# Patient Record
Sex: MALE | Race: WHITE | NOT HISPANIC OR LATINO | URBAN - NONMETROPOLITAN AREA
[De-identification: names, ages, dates, MRNs, and addresses within clinical notes are randomized per-mention and may not be internally consistent; named-entity substitution may affect disease eponyms.]

---

## 2017-02-07 ENCOUNTER — OFFICE VISIT (OUTPATIENT)
Dept: UROLOGY | Facility: CLINIC | Age: 47
End: 2017-02-07

## 2017-02-07 VITALS — DIASTOLIC BLOOD PRESSURE: 67 MMHG | SYSTOLIC BLOOD PRESSURE: 122 MMHG | HEART RATE: 80 BPM

## 2017-02-07 DIAGNOSIS — N52.9 ED (ERECTILE DYSFUNCTION) OF ORGANIC ORIGIN: Primary | ICD-10-CM

## 2017-02-07 DIAGNOSIS — N40.0 BENIGN NON-NODULAR PROSTATIC HYPERPLASIA WITHOUT LOWER URINARY TRACT SYMPTOMS: ICD-10-CM

## 2017-02-07 PROCEDURE — 99213 OFFICE O/P EST LOW 20 MIN: CPT | Performed by: NURSE PRACTITIONER

## 2017-02-07 RX ORDER — SILDENAFIL 100 MG/1
100 TABLET, FILM COATED ORAL AS NEEDED
Qty: 2 TABLET | Refills: 0 | COMMUNITY
Start: 2017-02-07 | End: 2019-10-15

## 2017-02-07 NOTE — PROGRESS NOTES
Chief Complaint:          Chief Complaint   Patient presents with   • Medicine Change       HPI:   46 y.o. male who is currently on Cialis fro BPH and erectile dysfunction presents wanting to try a sample of Viagra for erectile dysfunction.  States the Cialis works well most of the time but wanted to see if the Viagra would work better.    HPI        Past Medical History:        Past Medical History   Diagnosis Date   • Erectile dysfunction          Current Meds:     Current Outpatient Prescriptions   Medication Sig Dispense Refill   • tadalafil (CIALIS) 5 MG tablet Take  by mouth.       No current facility-administered medications for this visit.         Allergies:      No Known Allergies     Past Surgical History:     Past Surgical History   Procedure Laterality Date   • Vasectomy           Social History:     Social History     Social History   • Marital status: Single     Spouse name: N/A   • Number of children: N/A   • Years of education: N/A     Occupational History   • Not on file.     Social History Main Topics   • Smoking status: Current Every Day Smoker   • Smokeless tobacco: Not on file   • Alcohol use Yes   • Drug use: No   • Sexual activity: Not on file     Other Topics Concern   • Not on file     Social History Narrative       Family History:     Family History   Problem Relation Age of Onset   • No Known Problems Father    • No Known Problems Mother        Review of Systems:     Review of Systems   Constitutional: Negative for chills, fatigue and fever.   Respiratory: Positive for cough. Negative for shortness of breath and wheezing.    Cardiovascular: Negative for leg swelling.   Gastrointestinal: Negative for abdominal pain, nausea and vomiting.   Musculoskeletal: Negative for back pain and joint swelling.   Neurological: Negative for dizziness and headaches.   Psychiatric/Behavioral: Negative for confusion.       Physical Exam:     Physical Exam   Constitutional: He is oriented to person, place,  and time. He appears well-developed and well-nourished. No distress.   Neurological: He is alert and oriented to person, place, and time.   Skin: Skin is warm and dry. No rash noted. He is not diaphoretic. No erythema. No pallor.   Psychiatric: He has a normal mood and affect. His behavior is normal. Judgment and thought content normal.   Nursing note and vitals reviewed.      Procedure:     No notes on file      Assessment:     Encounter Diagnoses   Name Primary?   • ED (erectile dysfunction) of organic origin Yes   • Benign non-nodular prostatic hyperplasia without lower urinary tract symptoms      No orders of the defined types were placed in this encounter.      Plan:   Discussed Cialis use and Viagra use with the patient.  A sample of Viagra 100 mg given with directions of use.  Instructed him not to take the Cialis daily and the Viagra at the same time - verbalized understanding.    Counseling was given to patient for the following topics patient and family education, impressions and risks and benefits of treatment options. and the interim medical history and current results were reviewed.  A treatment plan with follow-up was made. Total time of the encounter was 15 minutes and 15 minutes were spent discussing ED (erectile dysfunction) of organic origin [N52.9] face-to-face.       This document has been electronically signed by SANTOS Christopher February 7, 2017 3:10 PM

## 2017-10-17 ENCOUNTER — OFFICE VISIT (OUTPATIENT)
Dept: UROLOGY | Facility: CLINIC | Age: 47
End: 2017-10-17

## 2017-10-17 VITALS — BODY MASS INDEX: 24.44 KG/M2 | HEIGHT: 69 IN | WEIGHT: 165 LBS

## 2017-10-17 DIAGNOSIS — N40.1 BENIGN PROSTATIC HYPERPLASIA WITH WEAK URINARY STREAM: Primary | ICD-10-CM

## 2017-10-17 DIAGNOSIS — R39.12 BENIGN PROSTATIC HYPERPLASIA WITH WEAK URINARY STREAM: Primary | ICD-10-CM

## 2017-10-17 PROCEDURE — 99213 OFFICE O/P EST LOW 20 MIN: CPT | Performed by: UROLOGY

## 2017-10-17 RX ORDER — TADALAFIL 5 MG/1
5 TABLET ORAL DAILY
Qty: 30 TABLET | Refills: 11 | Status: SHIPPED | OUTPATIENT
Start: 2017-10-17 | End: 2018-10-09 | Stop reason: SDUPTHER

## 2017-10-17 NOTE — PROGRESS NOTES
Chief Complaint:          Chief Complaint   Patient presents with   • Benign Prostatic Hypertrophy     Yearly follow up    • Erectile Dysfunction       HPI:   47 y.o. male.    HPI  Pt has been on daily cialis for BPH for 2 years with good results.    Past Medical History:        Past Medical History:   Diagnosis Date   • Erectile dysfunction          Current Meds:     Current Outpatient Prescriptions   Medication Sig Dispense Refill   • sildenafil (VIAGRA) 100 MG tablet Take 1 tablet by mouth As Needed for erectile dysfunction. 2 tablet 0   • tadalafil (CIALIS) 5 MG tablet Take 1 tablet by mouth Daily. 30 tablet 11     No current facility-administered medications for this visit.         Allergies:      No Known Allergies     Past Surgical History:     Past Surgical History:   Procedure Laterality Date   • VASECTOMY           Social History:     Social History     Social History   • Marital status: Single     Spouse name: N/A   • Number of children: N/A   • Years of education: N/A     Occupational History   • Not on file.     Social History Main Topics   • Smoking status: Current Every Day Smoker   • Smokeless tobacco: Not on file   • Alcohol use Yes   • Drug use: No   • Sexual activity: Not on file     Other Topics Concern   • Not on file     Social History Narrative       Family History:     Family History   Problem Relation Age of Onset   • No Known Problems Father    • No Known Problems Mother        Review of Systems:     Review of Systems    Physical Exam:     Physical Exam   Constitutional: He is oriented to person, place, and time.   HENT:   Head: Normocephalic and atraumatic.   Right Ear: External ear normal.   Left Ear: External ear normal.   Nose: Nose normal.   Mouth/Throat: Oropharynx is clear and moist.   Eyes: Conjunctivae and EOM are normal. Pupils are equal, round, and reactive to light.   Neck: Normal range of motion. Neck supple. No thyromegaly present.   Cardiovascular: Normal rate, regular  rhythm, normal heart sounds and intact distal pulses.    No murmur heard.  Pulmonary/Chest: Effort normal and breath sounds normal. No respiratory distress. He has no wheezes. He has no rales. He exhibits no tenderness.   Abdominal: Soft. Bowel sounds are normal. He exhibits no distension and no mass. There is no tenderness. No hernia.   Genitourinary: Rectum normal, prostate normal and penis normal.   Musculoskeletal: Normal range of motion. He exhibits no edema or tenderness.   Lymphadenopathy:     He has no cervical adenopathy.   Neurological: He is alert and oriented to person, place, and time. No cranial nerve deficit. He exhibits normal muscle tone. Coordination normal.   Skin: Skin is warm. No rash noted.   Psychiatric: He has a normal mood and affect. His behavior is normal. Judgment and thought content normal.   Nursing note and vitals reviewed.      Procedure:     No notes on file      Assessment:     Encounter Diagnosis   Name Primary?   • Benign prostatic hyperplasia with weak urinary stream Yes     Orders Placed This Encounter   Procedures   • PSA     Standing Status:   Future     Standing Expiration Date:   10/17/2020       Plan:   Will see pt back in on year for VU and psa.    Counseling was given to patient for the following topics impressions. and the interim medical history and current results were reviewed.  A treatment plan with follow-up was made. Total time of the encounter was 21 minutes and 21 minutes were spent discussing Benign prostatic hyperplasia with weak urinary stream [N40.1, R39.12] face-to-face.       This document has been electronically signed by Varun Sharma MD October 17, 2017 9:08 AM

## 2018-09-20 ENCOUNTER — TELEPHONE (OUTPATIENT)
Dept: UROLOGY | Facility: CLINIC | Age: 48
End: 2018-09-20

## 2018-09-20 NOTE — TELEPHONE ENCOUNTER
Patient called stating he needed a refill on his Cilias and I told the patient that he needed to keep his appt for 10/9/18. Dr cam will refill his Cilias on appointment date.

## 2018-10-09 ENCOUNTER — OFFICE VISIT (OUTPATIENT)
Dept: UROLOGY | Facility: CLINIC | Age: 48
End: 2018-10-09

## 2018-10-09 VITALS — WEIGHT: 157 LBS | HEIGHT: 69 IN | BODY MASS INDEX: 23.25 KG/M2

## 2018-10-09 DIAGNOSIS — N40.1 BENIGN PROSTATIC HYPERPLASIA WITH WEAK URINARY STREAM: ICD-10-CM

## 2018-10-09 DIAGNOSIS — R39.12 BENIGN PROSTATIC HYPERPLASIA WITH WEAK URINARY STREAM: ICD-10-CM

## 2018-10-09 LAB — PSA SERPL-MCNC: 1.27 NG/ML (ref 0–4)

## 2018-10-09 PROCEDURE — 36415 COLL VENOUS BLD VENIPUNCTURE: CPT | Performed by: UROLOGY

## 2018-10-09 PROCEDURE — 99213 OFFICE O/P EST LOW 20 MIN: CPT | Performed by: UROLOGY

## 2018-10-09 PROCEDURE — 84153 ASSAY OF PSA TOTAL: CPT | Performed by: UROLOGY

## 2018-10-09 RX ORDER — TADALAFIL 5 MG/1
5 TABLET ORAL DAILY
Qty: 30 TABLET | Refills: 11 | Status: SHIPPED | OUTPATIENT
Start: 2018-10-09 | End: 2019-09-23 | Stop reason: SDUPTHER

## 2018-10-09 NOTE — PROGRESS NOTES
Chief Complaint:          BPH and ED    HPI:   48 y.o. male.  Pt is on daily cialis 5 mg daily and it helps his BPH symptoms and he also benefits from it helping his erections.  Pt has yet to have a psa drawn this year.  HPI      Past Medical History:        Past Medical History:   Diagnosis Date   • Erectile dysfunction          Current Meds:     Current Outpatient Prescriptions   Medication Sig Dispense Refill   • sildenafil (VIAGRA) 100 MG tablet Take 1 tablet by mouth As Needed for erectile dysfunction. 2 tablet 0   • tadalafil (CIALIS) 5 MG tablet Take 1 tablet by mouth Daily. 30 tablet 11     No current facility-administered medications for this visit.         Allergies:      No Known Allergies     Past Surgical History:     Past Surgical History:   Procedure Laterality Date   • HERNIA REPAIR     • VASECTOMY           Social History:     Social History     Social History   • Marital status: Single     Spouse name: N/A   • Number of children: N/A   • Years of education: N/A     Occupational History   • Not on file.     Social History Main Topics   • Smoking status: Current Every Day Smoker   • Smokeless tobacco: Not on file   • Alcohol use Yes   • Drug use: No   • Sexual activity: Not on file     Other Topics Concern   • Not on file     Social History Narrative   • No narrative on file       Family History:     Family History   Problem Relation Age of Onset   • No Known Problems Father    • No Known Problems Mother        Review of Systems:     Review of Systems   Constitutional: Positive for fatigue.   HENT: Negative for sinus pain and sinus pressure.    Eyes: Negative for pain.   Respiratory: Negative for chest tightness.    Cardiovascular: Negative for chest pain.   Gastrointestinal: Negative for abdominal pain.   Endocrine: Negative for heat intolerance.   Genitourinary: Negative for frequency.   Musculoskeletal: Positive for back pain.   Skin: Negative for rash.   Allergic/Immunologic: Negative for food  "allergies.   Neurological: Negative for headaches.   Hematological: Does not bruise/bleed easily.             I have reviewed the follow portions of the patient's history and confirmed they are accurate today:  allergies, current medications, past family history, past medical history, past social history, past surgical history, problem list and ROS  Physical Exam:     Physical Exam   Constitutional: He is oriented to person, place, and time.   HENT:   Head: Normocephalic and atraumatic.   Right Ear: External ear normal.   Left Ear: External ear normal.   Nose: Nose normal.   Mouth/Throat: Oropharynx is clear and moist.   Eyes: Pupils are equal, round, and reactive to light. Conjunctivae and EOM are normal.   Neck: Normal range of motion. Neck supple. No thyromegaly present.   Cardiovascular: Normal rate, regular rhythm, normal heart sounds and intact distal pulses.    No murmur heard.  Pulmonary/Chest: Effort normal and breath sounds normal. No respiratory distress. He has no wheezes. He has no rales. He exhibits no tenderness.   Abdominal: Soft. Bowel sounds are normal. He exhibits no distension and no mass. There is no tenderness. No hernia.   Genitourinary: Rectum normal, prostate normal and penis normal.   Musculoskeletal: Normal range of motion. He exhibits no edema or tenderness.   Lymphadenopathy:     He has no cervical adenopathy.   Neurological: He is alert and oriented to person, place, and time. No cranial nerve deficit. He exhibits normal muscle tone. Coordination normal.   Skin: Skin is warm. No rash noted.   Psychiatric: He has a normal mood and affect. His behavior is normal. Judgment and thought content normal.   Nursing note and vitals reviewed.      Ht 175.3 cm (69\")   Wt 71.2 kg (157 lb)   BMI 23.18 kg/m²    Procedure:         Assessment:     Encounter Diagnosis   Name Primary?   • Benign prostatic hyperplasia with weak urinary stream      No orders of the defined types were placed in this " encounter.      Plan:   Will see pt next year and will check on the psa    Patient's Body mass index is 23.18 kg/m². BMI is within normal parameters. No follow-up required.      Counseling was given to patient for the following topics impressions as follows: BPH. The interim medical history and current results  This document has been electronically signed by Varun Sharma MD October 9, 2018 4:19 PM

## 2019-09-23 ENCOUNTER — TELEPHONE (OUTPATIENT)
Dept: UROLOGY | Facility: CLINIC | Age: 49
End: 2019-09-23

## 2019-09-23 DIAGNOSIS — R39.12 BENIGN PROSTATIC HYPERPLASIA WITH WEAK URINARY STREAM: ICD-10-CM

## 2019-09-23 DIAGNOSIS — N40.1 BENIGN PROSTATIC HYPERPLASIA WITH WEAK URINARY STREAM: ICD-10-CM

## 2019-09-23 RX ORDER — TADALAFIL 5 MG/1
5 TABLET ORAL DAILY
Qty: 30 TABLET | Refills: 11 | Status: SHIPPED | OUTPATIENT
Start: 2019-09-23 | End: 2019-10-15 | Stop reason: SDUPTHER

## 2019-10-15 ENCOUNTER — OFFICE VISIT (OUTPATIENT)
Dept: UROLOGY | Facility: CLINIC | Age: 49
End: 2019-10-15

## 2019-10-15 VITALS
BODY MASS INDEX: 22.66 KG/M2 | DIASTOLIC BLOOD PRESSURE: 72 MMHG | SYSTOLIC BLOOD PRESSURE: 118 MMHG | HEIGHT: 69 IN | WEIGHT: 153 LBS

## 2019-10-15 DIAGNOSIS — R35.0 FREQUENCY OF MICTURITION: Primary | ICD-10-CM

## 2019-10-15 DIAGNOSIS — N40.1 BENIGN PROSTATIC HYPERPLASIA WITH WEAK URINARY STREAM: ICD-10-CM

## 2019-10-15 DIAGNOSIS — R39.12 BENIGN PROSTATIC HYPERPLASIA WITH WEAK URINARY STREAM: ICD-10-CM

## 2019-10-15 LAB
BILIRUB BLD-MCNC: NEGATIVE MG/DL
CLARITY, POC: CLEAR
COLOR UR: YELLOW
GLUCOSE UR STRIP-MCNC: NEGATIVE MG/DL
KETONES UR QL: NEGATIVE
LEUKOCYTE EST, POC: NEGATIVE
NITRITE UR-MCNC: NEGATIVE MG/ML
PH UR: 6.5 [PH] (ref 5–8)
PROT UR STRIP-MCNC: NEGATIVE MG/DL
RBC # UR STRIP: NEGATIVE /UL
SP GR UR: 1.02 (ref 1–1.03)
UROBILINOGEN UR QL: NORMAL

## 2019-10-15 PROCEDURE — 81003 URINALYSIS AUTO W/O SCOPE: CPT | Performed by: UROLOGY

## 2019-10-15 PROCEDURE — 99213 OFFICE O/P EST LOW 20 MIN: CPT | Performed by: UROLOGY

## 2019-10-15 RX ORDER — IBUPROFEN 200 MG
200 TABLET ORAL AS NEEDED
COMMUNITY

## 2019-10-15 RX ORDER — TADALAFIL 5 MG/1
5 TABLET ORAL DAILY
Qty: 30 TABLET | Refills: 11 | Status: SHIPPED | OUTPATIENT
Start: 2019-10-15 | End: 2020-10-13 | Stop reason: SDUPTHER

## 2019-10-15 NOTE — PROGRESS NOTES
Chief Complaint:          BPH and ED.    HPI:   49 y.o. male.  Pt is voiding better with generic tadalafil daily 5 mg and it also helps his erections.  His psa is 1.27.  HPI      Past Medical History:        Past Medical History:   Diagnosis Date   • Erectile dysfunction          Current Meds:     Current Outpatient Medications   Medication Sig Dispense Refill   • ibuprofen (ADVIL,MOTRIN) 200 MG tablet Take 200 mg by mouth As Needed for Mild Pain .     • tadalafil (CIALIS) 5 MG tablet Take 1 tablet by mouth Daily. 30 tablet 11     No current facility-administered medications for this visit.         Allergies:      No Known Allergies     Past Surgical History:     Past Surgical History:   Procedure Laterality Date   • HERNIA REPAIR     • VASECTOMY           Social History:     Social History     Socioeconomic History   • Marital status: Single     Spouse name: Not on file   • Number of children: Not on file   • Years of education: Not on file   • Highest education level: Not on file   Tobacco Use   • Smoking status: Current Every Day Smoker   • Smokeless tobacco: Never Used   Substance and Sexual Activity   • Alcohol use: Yes   • Drug use: No       Family History:     Family History   Problem Relation Age of Onset   • No Known Problems Father    • No Known Problems Mother        Review of Systems:     Review of Systems   Constitutional: Negative for chills and fever.   HENT: Negative for sinus pressure and sinus pain.    Respiratory: Negative for cough.    Cardiovascular: Negative for leg swelling.   Gastrointestinal: Negative for abdominal pain.   Genitourinary: Negative for dysuria, frequency and urgency.   Musculoskeletal: Negative for back pain.   Neurological: Negative for headaches.   Psychiatric/Behavioral: The patient is not nervous/anxious.        IPSS Questionnaire (AUA-7):  Over the past month…    1)  Incomplete Emptying  How often have you had a sensation of not emptying your bladder?  0 - Not at all   2)   Frequency  How often have you had to urinate less than every two hours? 0 - Not at all   3)  Intermittency  How often have you found you stopped and started again several times when you urinated?  0 - Not at all   4) Urgency  How often have you found it difficult to postpone urination?  0 - Not at all   5) Weak Stream  How often have you had a weak urinary stream?  0 - Not at all   6) Straining  How often have you had to push or strain to begin urination?  0 - Not at all   7) Nocturia  How many times did you typically get up at night to urinate?  0 - None   Total Score:  0       Quality of life due to urinary symptoms:  If you were to spend the rest of your life with your urinary condition the way it is now, how would you feel about that? 1-Pleased   Urine Leakage (Incontinence) 0-No Leakage       I have reviewed the follow portions of the patient's history and confirmed they are accurate today:  allergies, current medications, past family history, past medical history, past social history, past surgical history, problem list and ROS  Physical Exam:     Physical Exam   Constitutional: He is oriented to person, place, and time.   HENT:   Head: Normocephalic and atraumatic.   Right Ear: External ear normal.   Left Ear: External ear normal.   Nose: Nose normal.   Mouth/Throat: Oropharynx is clear and moist.   Eyes: Conjunctivae and EOM are normal. Pupils are equal, round, and reactive to light.   Neck: Normal range of motion. Neck supple. No thyromegaly present.   Cardiovascular: Normal rate, regular rhythm, normal heart sounds and intact distal pulses.   No murmur heard.  Pulmonary/Chest: Effort normal and breath sounds normal. No respiratory distress. He has no wheezes. He has no rales. He exhibits no tenderness.   Abdominal: Soft. Bowel sounds are normal. He exhibits no distension and no mass. There is no tenderness. No hernia.   Genitourinary: Rectum normal, prostate normal and penis normal.   Musculoskeletal:  "Normal range of motion. He exhibits no edema or tenderness.   Lymphadenopathy:     He has no cervical adenopathy.   Neurological: He is alert and oriented to person, place, and time. No cranial nerve deficit. He exhibits normal muscle tone. Coordination normal.   Skin: Skin is warm. No rash noted.   Psychiatric: He has a normal mood and affect. His behavior is normal. Judgment and thought content normal.   Nursing note and vitals reviewed.      /72 (BP Location: Left arm, Patient Position: Sitting, Cuff Size: Adult)   Ht 175.3 cm (69\")   Wt 69.4 kg (153 lb)   BMI 22.59 kg/m²    Procedure:         Assessment:     Encounter Diagnoses   Name Primary?   • Frequency of micturition Yes   • Benign prostatic hyperplasia with weak urinary stream        Orders Placed This Encounter   Procedures   • PSA DIAGNOSTIC     Standing Status:   Future     Standing Expiration Date:   10/15/2020   • POC Urinalysis Dipstick, Automated       Patient reports that he is not currently experiencing any symptoms of urinary incontinence.      Plan:   Will see pt next year..    Patient's Body mass index is 22.59 kg/m². BMI is within normal parameters. No follow-up required..      Smoking Cessation Counseling:  Former smoker.  Patient does not currently use any tobacco products.     Counseling was given to patient for the following topics impressions as follows: bph. The interim medical history and current results were reviewed.  A treatment plan with follow-up was made for Frequency of micturition [R35.0].     This document has been electronically signed by Varun Sharma MD October 15, 2019 3:24 PM      "

## 2020-10-13 ENCOUNTER — OFFICE VISIT (OUTPATIENT)
Dept: UROLOGY | Facility: CLINIC | Age: 50
End: 2020-10-13

## 2020-10-13 VITALS — WEIGHT: 158.4 LBS | TEMPERATURE: 98.1 F | HEIGHT: 69 IN | BODY MASS INDEX: 23.46 KG/M2

## 2020-10-13 DIAGNOSIS — R39.12 BENIGN PROSTATIC HYPERPLASIA WITH WEAK URINARY STREAM: ICD-10-CM

## 2020-10-13 DIAGNOSIS — N40.1 BENIGN PROSTATIC HYPERPLASIA WITH WEAK URINARY STREAM: ICD-10-CM

## 2020-10-13 PROCEDURE — 99213 OFFICE O/P EST LOW 20 MIN: CPT | Performed by: UROLOGY

## 2020-10-13 RX ORDER — TADALAFIL 5 MG/1
5 TABLET ORAL DAILY
Qty: 30 TABLET | Refills: 11 | Status: SHIPPED | OUTPATIENT
Start: 2020-10-13 | End: 2020-11-02

## 2020-10-13 NOTE — PROGRESS NOTES
Chief Complaint:          BPH    HPI:   50 y.o. male.  Pt is voiding well and his psa is 0.7  Pt is doing well on cialis 5 mg daily.  HPI      Past Medical History:        Past Medical History:   Diagnosis Date   • Erectile dysfunction          Current Meds:     Current Outpatient Medications   Medication Sig Dispense Refill   • ibuprofen (ADVIL,MOTRIN) 200 MG tablet Take 200 mg by mouth As Needed for Mild Pain .     • tadalafil (Cialis) 5 MG tablet Take 1 tablet by mouth Daily. 30 tablet 11     No current facility-administered medications for this visit.         Allergies:      No Known Allergies     Past Surgical History:     Past Surgical History:   Procedure Laterality Date   • HERNIA REPAIR     • VASECTOMY           Social History:     Social History     Socioeconomic History   • Marital status: Single     Spouse name: Not on file   • Number of children: Not on file   • Years of education: Not on file   • Highest education level: Not on file   Tobacco Use   • Smoking status: Current Every Day Smoker   • Smokeless tobacco: Never Used   Substance and Sexual Activity   • Alcohol use: Yes   • Drug use: No       Family History:     Family History   Problem Relation Age of Onset   • No Known Problems Father    • No Known Problems Mother        Review of Systems:     Review of Systems   Constitutional: Negative for chills, fatigue and fever.   HENT: Negative for congestion and sinus pressure.    Respiratory: Negative for chest tightness and shortness of breath.    Cardiovascular: Negative for chest pain.   Gastrointestinal: Negative for abdominal pain, constipation, diarrhea, nausea and vomiting.   Genitourinary: Negative for flank pain, frequency, hematuria and urgency.   Musculoskeletal: Positive for neck pain. Negative for back pain.   Skin: Negative for rash.   Neurological: Negative for dizziness and headaches.   Hematological: Does not bruise/bleed easily.   Psychiatric/Behavioral: The patient is not  "nervous/anxious.        II have reviewed the follow portions of the patient's history and confirmed they are accurate today:  allergies, current medications, past family history, past medical history, past social history, past surgical history, problem list and ROS  Physical Exam:     Physical Exam    Temp 98.1 °F (36.7 °C)   Ht 175.3 cm (69.02\")   Wt 71.8 kg (158 lb 6.4 oz)   BMI 23.38 kg/m²    Procedure:         Assessment:     Encounter Diagnosis   Name Primary?   • Benign prostatic hyperplasia with weak urinary stream        No orders of the defined types were placed in this encounter.      Patient reports that he is not currently experiencing any symptoms of urinary incontinence.      Plan:   Pt doing well will refill cialis daily and will see him in a year with psa prior.    Patient's Body mass index is 23.38 kg/m². BMI is within normal parameters. No follow-up required..      Smoking Cessation Counseling:  Former smoker.       Counseling was given to patient for the following topics instructions for management as follows: BPH. The interim medical history and current results were reviewed.  A treatment plan with follow-up was made for No primary diagnosis found.. I spent 23 minutes face to face with Adam Box and 80 percentage was spent in counseling.       This document has been electronically signed by Varun Sharma MD October 13, 2020 16:24 EDT      "

## 2020-11-01 DIAGNOSIS — R39.12 BENIGN PROSTATIC HYPERPLASIA WITH WEAK URINARY STREAM: ICD-10-CM

## 2020-11-01 DIAGNOSIS — N40.1 BENIGN PROSTATIC HYPERPLASIA WITH WEAK URINARY STREAM: ICD-10-CM

## 2020-11-02 RX ORDER — TADALAFIL 5 MG/1
TABLET ORAL
Qty: 30 TABLET | Refills: 11 | Status: SHIPPED | OUTPATIENT
Start: 2020-11-02 | End: 2021-10-15 | Stop reason: SDUPTHER

## 2021-10-15 ENCOUNTER — OFFICE VISIT (OUTPATIENT)
Dept: UROLOGY | Facility: CLINIC | Age: 51
End: 2021-10-15

## 2021-10-15 VITALS — BODY MASS INDEX: 23.64 KG/M2 | WEIGHT: 159.6 LBS | HEIGHT: 69 IN

## 2021-10-15 DIAGNOSIS — A63.0: Primary | ICD-10-CM

## 2021-10-15 DIAGNOSIS — R39.12 BENIGN PROSTATIC HYPERPLASIA WITH WEAK URINARY STREAM: ICD-10-CM

## 2021-10-15 DIAGNOSIS — N52.9 ED (ERECTILE DYSFUNCTION) OF ORGANIC ORIGIN: ICD-10-CM

## 2021-10-15 DIAGNOSIS — N40.1 BENIGN PROSTATIC HYPERPLASIA WITH WEAK URINARY STREAM: ICD-10-CM

## 2021-10-15 DIAGNOSIS — A63.0 GENITAL WARTS DUE TO HPV (HUMAN PAPILLOMAVIRUS): ICD-10-CM

## 2021-10-15 PROCEDURE — 99214 OFFICE O/P EST MOD 30 MIN: CPT | Performed by: NURSE PRACTITIONER

## 2021-10-15 PROCEDURE — 84153 ASSAY OF PSA TOTAL: CPT | Performed by: NURSE PRACTITIONER

## 2021-10-15 RX ORDER — TADALAFIL 5 MG/1
5 TABLET ORAL DAILY
Qty: 30 TABLET | Refills: 11 | Status: SHIPPED | OUTPATIENT
Start: 2021-10-15 | End: 2022-11-01 | Stop reason: SDUPTHER

## 2021-10-15 NOTE — PROGRESS NOTES
Chief Complaint  Benign Prostatic Hypertrophy/ED/CONDYLOMA (ONE YEAR FOLLOW UP/VU/MED REFILLS)    Subjective          Adam Viramontes presents to [facility]  History of Present Illness     MR. ADAM VIRAMONTES is a very pleasant 51-year-old male who returns to clinic today for evaluation.  This is yearly follow-up for BPH, and ED.   He is here for his yearly prostate exam.  His most recent PSA is 0.596 on 10/15/2021, his PSA prior was 0.7 and 2920, was 1.2703 years ago.  He has a slow stream, urine hesitancy, but denies having any urinary symptoms of frequency urgency or dysuria.  He reports doing relatively well on daily Cialis.      He is glad to be doing better this year.  Patient denies any issues with BPH recently. He deferred his prostate exam today, but reports his VU last time with Dr. Sharma was negative for nodules, He has good rectal tone, and Enlarged smooth firm prostate. There is no nodularity or any suspicious rectal abnormalities. HE  Denies pelvic pressure or supra pubic discomfort. Denies abdominal, back or flank pain. No N/V/D. Denies chills or fevers. His urine dipstick today is completely negative for any infection, it is negative for gross/microscopic hematuria. His IPSS score is 3.    He HAD concerns of Erectile Dysfunction ongoing for years, now significantly improved since starting Cialis. He reports there has been a significant improvement in his libido or sex drive, increased  energy, strength and endurance,. He reports less sad and grumpy feelings recently, with a significant improvement in his ability to handle his erections.  He has been managed on Cialis 5 mg daily.  Patient would like some refills.    FINALLY, He has multiple raised, pinkish, small growths resembling tiny cauliflower that are consistent with condylomas on his glans penis and scrotum.  He states this has been ongoing for few few years, is becoming very bothersome to him.   "Although asymptomatic, he would like to try some ointment at this time-he is Adamant about fulguration. He denies any recent STD exposure. He denies any penile discharge, denies any pain or discomfort.     He is relatively healthy with no significant medical problems, besides listed below    Objective   Vital Signs:   Ht 175.3 cm (69\")   Wt 72.4 kg (159 lb 9.6 oz)   BMI 23.57 kg/m²     Physical Exam  Constitutional:       General: He is not in acute distress.     Appearance: He is well-developed.      Comments: Very pleasant patient calm, collected   HENT:      Head: Normocephalic and atraumatic.      Right Ear: External ear normal.      Left Ear: External ear normal.   Eyes:      General:         Right eye: No discharge.         Left eye: No discharge.      Conjunctiva/sclera: Conjunctivae normal.      Pupils: Pupils are equal, round, and reactive to light.   Neck:      Thyroid: No thyromegaly.      Trachea: No tracheal deviation.   Cardiovascular:      Rate and Rhythm: Normal rate and regular rhythm.      Heart sounds: No murmur heard.  No friction rub.   Pulmonary:      Effort: Pulmonary effort is normal. No respiratory distress.      Breath sounds: Normal breath sounds. No stridor.   Abdominal:      General: Bowel sounds are normal. There is no distension.      Palpations: Abdomen is soft.      Tenderness: There is abdominal tenderness. There is no guarding.   Genitourinary:     Penis: Normal and uncircumcised. No tenderness or discharge.       Testes: Normal.      Rectum: Normal. Guaiac result negative.          Comments: VU-deferred, multiple condyloma on penile shaft  Musculoskeletal:         General: Tenderness present. No deformity. Normal range of motion.      Cervical back: Normal range of motion and neck supple.   Skin:     General: Skin is warm and dry.      Capillary Refill: Capillary refill takes less than 2 seconds.      Coloration: Skin is pale.   Neurological:      Mental Status: He is alert and " oriented to person, place, and time.      Cranial Nerves: No cranial nerve deficit.      Coordination: Coordination normal.   Psychiatric:         Behavior: Behavior normal.         Thought Content: Thought content normal.         Judgment: Judgment normal.        Result Review :                 Assessment and Plan    Diagnoses and all orders for this visit:    1. 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  Dispense: 3.5 g; Refill: 1  -     podofilox (CONDYLOX) 0.5 % gel; Apply  topically to the appropriate area as directed 2 (Two) Times a Day for 5 days.  Dispense: 3.5 g; Refill: 1    2. Benign prostatic hyperplasia with weak urinary stream  -     tadalafil (CIALIS) 5 MG tablet; Take 1 tablet by mouth Daily.  Dispense: 30 tablet; Refill: 11  -     PSA Diagnostic; Future  -     PSA Diagnostic    3. Genital warts due to HPV (human papillomavirus)  -     Discontinue: podofilox (CONDYLOX) 0.5 % gel; Apply  topically to the appropriate area as directed 2 (Two) Times a Day for 5 days.  Dispense: 3.5 g; Refill: 1  -     podofilox (CONDYLOX) 0.5 % gel; Apply  topically to the appropriate area as directed 2 (Two) Times a Day for 5 days.  Dispense: 3.5 g; Refill: 1    4. ED (erectile dysfunction) of organic origin    tadalafil (CIALIS) 5 MG tablet; Take 1 tablet by mouth Daily.  Dispense: 30 tablet; Refill: 11  -     PSA Diagnostic; Future  -     PSA Diagnostic                                                   ASSESSMENT  Benign Prostatic Hypertrophy Without Urinary Obstruction/ED/CONDYLOMA: Patient presents for his 1 year follow-up and refill of his medications.  He is in no apparent distress and has minor urinary problems. His urine dipstick is completely negative for any infection, it is negative for gross/microscopic hematuria. His IPSS score is 3, his last PSA was  0.7    We discussed the pathophysiology of BPH. We discussed the static and dynamic effect effects of BPH as well as using 5 alpha reductase inhibitors versus alpha blockade.  We discussed the indications for transurethral surgery as well.  And/ or other therapeutic options available including all of the newer techniques.   Patient's last PSA was 0.87, and he reports only minor urinary symptoms at this time.    PSA testing: We discussed rechecking a PSA blood test that stands for prostate specific antigen.  I discussed the pathophysiology of PSA testing indicating its use in the diagnosis and management of prostate cancer.  I discussed the normal range being 0-4 but more appropriately being much closer to 0-2 in a normal male.  I discussed the fact that after certain age we don't recommend PSA testing especially in view of numerous comorbidities.  That this will not be a useful test.  I discussed many of the things that can artificially raised PSA including a recent infection, urinary tract infection, recent sexual intercourse.      Finally, WE discussed ED. The patient desires Cialis 5 mg daily to treat both his Erectile dysfunction/BPH.  He is informed that Viagra is sometimes not covered by insurance. It is available on a fee-for-service cost basis, and is relatively expensive. With higher doses greater than 10 mg, the method of use 1 hour prior to anticipated intercourse is explained. He should not use any more than one tablet in a 24 hour period. The side effects of possible headache, flushing, dyspepsia and transient changes in vision have been explained. The patient is not taking nitrates, and denies he has access to nitrates in any form at any time. I have counseled him that taking Viagra with nitrates of any form can cause death.    Condyloma/Genital Warts: he has multiple condylomas on his penile shaft presenting as small growths resembling tiny cauliflower.  He is desirous of intervention and would like a  cream. He reports he has been completely asymptomatic, and does not have any urinary /STI symptoms today.  We discussed fulguration, he is adamant, patient wants to try cream first.    Although People with genital warts have a variety of treatments to choose from, educated patient that  but none is a perfect cure. Genital wart treatments fall into three categories: prescription topical preparations that destroy wart tissue; surgical methods that remove wart tissue; and biological-based approaches that target the virus causing the underlying condition. Also explained to patient  The viral nature of genital warts suggests that anti-viral therapies may be effective, alongside Standard treatments which may burn, scrape, freeze, or use a laser to remove affected tissue.       PLAN  WE CHECKED HIS PSA TODAY, I WILL CALL HIM WITH RESULTS    CONTINUE CIALIS 5 MG DAILY FOR BPH WITH  ED    (PT Educated to NEVER use alongside with Viagra)    Continue all other medications, plan of care for condyloma.    Follow up in ONE YEAR, SOONER IF NEED BE    Patient is agreeable with plan of care.      Follow Up   Return in about 1 year (around 10/15/2022) for Next scheduled follow up/VU/PSA/REFILLS/EVAL CONDYLOMA.     Patient was given instructions and counseling regarding his condition or for health maintenance advice. Please see specific information pulled into the AVS if appropriate.

## 2021-10-16 LAB — PSA SERPL-MCNC: 0.6 NG/ML (ref 0–4)

## 2021-10-19 ENCOUNTER — TELEPHONE (OUTPATIENT)
Dept: UROLOGY | Facility: CLINIC | Age: 51
End: 2021-10-19

## 2021-10-21 PROBLEM — A63.0 CONDYLOMA ACUMINATUM DUE TO HUMAN PAPILLOMAVIRUS: Status: ACTIVE | Noted: 2021-10-21

## 2021-10-21 PROBLEM — N40.1 BENIGN PROSTATIC HYPERPLASIA WITH WEAK URINARY STREAM: Status: ACTIVE | Noted: 2021-10-21

## 2021-10-21 PROBLEM — R39.12 BENIGN PROSTATIC HYPERPLASIA WITH WEAK URINARY STREAM: Status: ACTIVE | Noted: 2021-10-21

## 2022-11-01 ENCOUNTER — OFFICE VISIT (OUTPATIENT)
Dept: UROLOGY | Facility: CLINIC | Age: 52
End: 2022-11-01

## 2022-11-01 VITALS — HEIGHT: 69 IN | WEIGHT: 159 LBS | BODY MASS INDEX: 23.55 KG/M2

## 2022-11-01 DIAGNOSIS — N52.9 ED (ERECTILE DYSFUNCTION) OF ORGANIC ORIGIN: ICD-10-CM

## 2022-11-01 DIAGNOSIS — R33.9 INCOMPLETE EMPTYING OF BLADDER: ICD-10-CM

## 2022-11-01 DIAGNOSIS — R35.0 FREQUENCY OF MICTURITION: ICD-10-CM

## 2022-11-01 DIAGNOSIS — A63.0: ICD-10-CM

## 2022-11-01 DIAGNOSIS — R39.12 BENIGN PROSTATIC HYPERPLASIA WITH WEAK URINARY STREAM: Primary | ICD-10-CM

## 2022-11-01 DIAGNOSIS — N40.1 BENIGN PROSTATIC HYPERPLASIA WITH WEAK URINARY STREAM: Primary | ICD-10-CM

## 2022-11-01 LAB
BILIRUB BLD-MCNC: ABNORMAL MG/DL
CLARITY, POC: CLEAR
COLOR UR: ABNORMAL
EXPIRATION DATE: ABNORMAL
GLUCOSE UR STRIP-MCNC: NEGATIVE MG/DL
KETONES UR QL: ABNORMAL
LEUKOCYTE EST, POC: NEGATIVE
Lab: ABNORMAL
NITRITE UR-MCNC: NEGATIVE MG/ML
PH UR: 5 [PH] (ref 5–8)
PROT UR STRIP-MCNC: ABNORMAL MG/DL
RBC # UR STRIP: NEGATIVE /UL
SP GR UR: 1.02 (ref 1–1.03)
UROBILINOGEN UR QL: NORMAL

## 2022-11-01 PROCEDURE — 87086 URINE CULTURE/COLONY COUNT: CPT | Performed by: NURSE PRACTITIONER

## 2022-11-01 PROCEDURE — 99214 OFFICE O/P EST MOD 30 MIN: CPT | Performed by: NURSE PRACTITIONER

## 2022-11-01 PROCEDURE — 84153 ASSAY OF PSA TOTAL: CPT | Performed by: NURSE PRACTITIONER

## 2022-11-01 PROCEDURE — 81003 URINALYSIS AUTO W/O SCOPE: CPT | Performed by: NURSE PRACTITIONER

## 2022-11-01 PROCEDURE — 84154 ASSAY OF PSA FREE: CPT | Performed by: NURSE PRACTITIONER

## 2022-11-01 RX ORDER — PODOFILOX 5 MG/G
GEL TOPICAL 2 TIMES DAILY
Qty: 3.5 G | Refills: 10 | Status: SHIPPED | OUTPATIENT
Start: 2022-11-01

## 2022-11-01 RX ORDER — TADALAFIL 5 MG/1
5 TABLET ORAL DAILY
Qty: 30 TABLET | Refills: 11 | Status: SHIPPED | OUTPATIENT
Start: 2022-11-01

## 2022-11-01 NOTE — PROGRESS NOTES
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 Fer Box is a pleasant 52-year-old male who returns to clinic today for evaluation. This is his yearly follow-up for BPH, ED, HPV. On evaluation in clinic last year, his most recent PSA was 0.596 ng/mL on 10/15/2021. His PSA prior was 0.7 ng/mL on 2/09/2020.  The patient is currently on Cialis 5 mg daily for BPH and ED.  He denies any side effects from the medications.     Prior to initiating therapy with Cialis, The patient had reported BPH with lower urinary tract symptoms, specifically a slow stream, and urine hesitancy, but he had denied any urinary symptoms of frequency, urgency, dysuria, or burning with urination.  He however denied any issues with recurrent UTIs, he denied any issues with perineal pain or discomfort consistent with prostatitis.  He had also deferred a prostate exam last year so we will reevaluate this year.  Nevertheless His last VU  done by Dr. Sharma over 2 years ago, was negative for any nodularity, He had no suspicious rectal abnormalities.     He also had concerns of Erectile Dysfunction ongoing for years significantly improved with medications-CIALIS. The patient also had concerns of condyloma with multiple warts on his glans penis. He had declined any recent STD exposure, and declined fulguration in clinic. The patient had requested a cream for therapy with his condyloma-CONDYLOX. He returns to clinic today for evaluation, his urine dipstick is completely negative for any infection, it is negative for growth/microscopic hematuria. He has 1+ protein, ketones and bilirubin. His PVR is 0 mL.    The patient reports that he has been doing well. He states that  "he has been asymptomatic. He denies any urinary frequency or urgency. He reports that he was able to pickup the condylox cream, and adds that it worked but not permanently. He reports that the condylox cream provided relief for about 1.5 months. The patient denied a prostate exam at today's visit.     Objective   Vital Signs:   Ht 175.3 cm (69.02\")   Wt 72.1 kg (159 lb)   BMI 23.47 kg/m²       ROS:   Review of Systems   Constitutional: Negative for activity change, appetite change, diaphoresis, fatigue, fever, unexpected weight gain and unexpected weight loss.   HENT: Negative for congestion, ear discharge, ear pain, nosebleeds, rhinorrhea, sinus pressure and sore throat.    Eyes: Negative for blurred vision, double vision, photophobia, pain, redness and visual disturbance.   Respiratory: Negative for apnea, cough, chest tightness, shortness of breath, wheezing and stridor.    Cardiovascular: Negative for chest pain and palpitations.   Gastrointestinal: Negative for abdominal distention, abdominal pain, constipation, diarrhea, nausea and vomiting.   Endocrine: Negative for polydipsia, polyphagia and polyuria.   Genitourinary: Negative for decreased urine volume, difficulty urinating, dysuria, flank pain, frequency, hematuria, urgency and urinary incontinence.   Musculoskeletal: Negative for arthralgias and joint swelling.   Skin: Negative for pallor, rash and wound.   Neurological: Negative for dizziness, tremors, syncope, weakness, light-headedness, memory problem and confusion.   Psychiatric/Behavioral: Negative for behavioral problems.        Physical Exam  Constitutional:       General: He is in acute distress.      Appearance: He is well-developed.   HENT:      Head: Normocephalic and atraumatic.      Right Ear: External ear normal.      Left Ear: External ear normal.   Eyes:      General:         Right eye: No discharge.         Left eye: No discharge.      Conjunctiva/sclera: Conjunctivae normal.      " Pupils: Pupils are equal, round, and reactive to light.   Neck:      Thyroid: No thyromegaly.      Trachea: No tracheal deviation.   Cardiovascular:      Rate and Rhythm: Normal rate and regular rhythm.      Heart sounds: No murmur heard.    No friction rub.   Pulmonary:      Effort: Pulmonary effort is normal. No respiratory distress.      Breath sounds: Normal breath sounds. No stridor.   Abdominal:      General: Bowel sounds are normal. There is no distension.      Palpations: Abdomen is soft.      Tenderness: There is abdominal tenderness. There is no guarding.   Genitourinary:     Penis: Normal and uncircumcised. No tenderness or discharge.       Testes: Normal.      Rectum: Normal. Guaiac result negative.      Comments: WEAK STREAM/CONDYLOMA/DECLINED VU    Musculoskeletal:         General: Tenderness present. No deformity. Normal range of motion.      Cervical back: Normal range of motion and neck supple.   Skin:     General: Skin is warm and dry.      Capillary Refill: Capillary refill takes less than 2 seconds.      Coloration: Skin is pale.   Neurological:      Mental Status: He is alert and oriented to person, place, and time.      Cranial Nerves: No cranial nerve deficit.      Coordination: Coordination normal.   Psychiatric:         Behavior: Behavior normal.         Thought Content: Thought content normal.         Judgment: Judgment normal.          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    Relevant Medications    podofilox (Condylox) 0.5 % gel   Other Visit Diagnoses     Frequency of micturition        Relevant Medications    tadalafil (CIALIS) 5 MG tablet    Other Relevant Orders    POC Urinalysis Dipstick, Automated (Completed)    Urine Culture - Urine, Urine, Clean Catch    PSA Total+% Free (Serial)    Incomplete emptying of bladder        Relevant Medications    tadalafil (CIALIS) 5 MG tablet    Other Relevant Orders    Bladder Scan (Completed)    PSA Total+% Free (Serial)                                                      ASSESSMENT  Benign Prostatic Hypertrophy Without Urinary Obstruction/ED/CONDYLOMA: Patient presents for his 1 year follow-up and refill of his medications.  He is in no apparent distress and has minor urinary problems. His urine dipstick is completely negative for any infection, it is negative for gross/microscopic hematuria. His IPSS score is 3, his last PSA was 0.59, IT WAS 0.7 PRIOR     AGAIN, WE Discussed the pathophysiology of BPH and obstruction. We discussed the static and dynamic effects of BPH as well as using 5 alpha reductase inhibitors versus alpha blockade.  We discussed the indications for transurethral surgery as well.  And/ or other therapeutic options available including all of the newer techniques.  He has no real symptomatology referable to his prostate other than moderate enlargement.  Rather than proceed with an expensive workup he doesn't need, at this time I am recommending an alpha blocker with daily Cialis 5 mg.     PSA testing: We discussed rechecking a PSA blood test that stands for prostate specific antigen.  I discussed the pathophysiology of PSA testing indicating its use in the diagnosis and management of prostate cancer.  I discussed the normal range being 0-4 but more appropriately being much closer to 0-2 in a normal male.  I discussed the fact that after certain age  we don't recommend PSA testing especially in view of numerous comorbidities.  That this will not be a useful test.  I discussed many of the things that can artificially raised PSA including a recent infection, urinary tract infection, recent sexual intercourse.       Finally, WE discussed ED. The patient desires Cialis 5 mg daily to treat both his Erectile dysfunction/BPH.  He is informed that CIALIS/Viagra is sometimes not covered by insurance. It is available on a fee-for-service cost basis, and is relatively expensive. With higher doses greater than 10 mg, the method of use 1 hour prior to anticipated intercourse is explained. He should not use any more than one tablet in a 24 hour period. The side effects of possible headache, flushing, dyspepsia and transient changes in vision have been explained. The patient is not taking nitrates, and denies he has access to nitrates in any form at any time. I have counseled him that taking Viagra with nitrates of any form can cause death.     Condyloma/Genital Warts: he has multiple condylomas on his penile shaft presenting as small growths resembling tiny cauliflower.  He is desirous of intervention and would like a cream. He reports he has been completely asymptomatic, and does not have any urinary /STI symptoms today.  We discussed fulguration, he is adamant, patient wants to continue with Condylox cream.      Although People with genital warts have a variety of treatments to choose from, educated patient that  but none is a perfect cure. Genital wart treatments fall into three categories: prescription topical preparations that destroy wart tissue; surgical methods that remove wart tissue; and biological-based approaches that target the virus causing the underlying condition. Also explained to patient  The viral nature of genital warts suggests that anti-viral therapies may be effective, alongside Standard treatments which may burn, scrape, freeze, or use a laser to  remove affected tissue.                                         PLAN  WE CHECKED HIS PSA TODAY, I WILL CALL HIM WITH RESULTS     CONTINUE CIALIS 5 MG DAILY FOR BPH WITH  ED.(PT Educated to NEVER use alongside with Viagra)    CONDYLOX GEL PRN FOR CONDYLOMA     Continue all other medications as prescribed, plan of care for Condyloma.     Follow up in ONE YEAR, SOONER IF NEED BE     Patient is agreeable with plan of care.     Patient reports that he is not currently experiencing any symptoms of urinary incontinence.    BMI is within normal parameters. No other follow-up for BMI required.    RADIOLOGY (CT AND/OR KUB):    CT Abdomen and Pelvis: No results found for this or any previous visit.     CT Stone Protocol: No results found for this or any previous visit.     KUB: No results found for this or any previous visit.       LABS (3 MONTHS):    Office Visit on 11/01/2022   Component Date Value Ref Range Status   • Color 11/01/2022 Minal  Yellow, Straw, Dark Yellow, Minal Final   • Clarity, UA 11/01/2022 Clear  Clear Final   • Specific Gravity  11/01/2022 1.025  1.005 - 1.030 Final   • pH, Urine 11/01/2022 5.0  5.0 - 8.0 Final   • Leukocytes 11/01/2022 Negative  Negative Final   • Nitrite, UA 11/01/2022 Negative  Negative Final   • Protein, POC 11/01/2022 1+ (A)  Negative mg/dL Final   • Glucose, UA 11/01/2022 Negative  Negative mg/dL Final   • Ketones, UA 11/01/2022 1+ (A)  Negative Final   • Urobilinogen, UA 11/01/2022 Normal  Normal, 0.2 E.U./dL Final   • Bilirubin 11/01/2022 Small (1+) (A)  Negative Final   • Blood, UA 11/01/2022 Negative  Negative Final   • Lot Number 11/01/2022 [id number]   Final   • Expiration Date 11/01/2022 2/8/24   Final        Smoking Cessation Counseling:  Current every day smoker. less than 3 minutes spent counseling. Not agreeable to stopping.  I advised patient to quit tobacco use and offered support.  I provided patient with tobacco cessation educational material printed in the  patient's After Visit Summary.       Follow Up   Return in about 1 year (around 11/1/2023) for Next scheduled follow up, FOR BPH WITH LUTS/PSA/ED/CONDYLOMA/MED REFILLS/VU.    Patient was given instructions and counseling regarding his condition or for health maintenance advice. Please see specific information pulled into the AVS if appropriate.          This document has been electronically signed by Griselda Cheng-Akwa, APRN   November 1, 2022 17:22 EDT     Transcribed from ambient dictation for Griselda Cheng-Akwa, APRN by Victoria Szymanski.  11/01/22   13:14 EDT    Patient or patient representative verbalized consent to the visit recording.  I have personally performed the services described in this document as transcribed by the above individual, and it is both accurate and complete.  Griselda Cheng-Akwa, APRN  11/1/2022  17:23 EDT        Dictated Utilizing Dragon Dictation: Part of this note may be an electronic transcription/translation of spoken language to printed text using the Dragon Dictation System.

## 2022-11-02 LAB
BACTERIA SPEC AEROBE CULT: NO GROWTH
PSA FREE MFR SERPL: 41.7 %
PSA FREE SERPL-MCNC: 0.25 NG/ML
PSA SERPL-MCNC: 0.6 NG/ML (ref 0–4)

## 2022-11-03 ENCOUNTER — TELEPHONE (OUTPATIENT)
Dept: UROLOGY | Facility: CLINIC | Age: 52
End: 2022-11-03

## 2022-11-03 NOTE — TELEPHONE ENCOUNTER
I called pt to go over psa results pt phone is disconnected, if pt calls back I will talk to him.          ----- Message from Griselda Cheng-Akwa, APRN sent at 11/3/2022  8:14 AM EDT -----  Please let patient know PSA results, follow-up in clinic next year thank you

## 2023-11-01 ENCOUNTER — OFFICE VISIT (OUTPATIENT)
Dept: UROLOGY | Facility: CLINIC | Age: 53
End: 2023-11-01
Payer: COMMERCIAL

## 2023-11-01 VITALS
BODY MASS INDEX: 23.37 KG/M2 | DIASTOLIC BLOOD PRESSURE: 60 MMHG | SYSTOLIC BLOOD PRESSURE: 96 MMHG | WEIGHT: 157.8 LBS | HEIGHT: 69 IN | HEART RATE: 69 BPM

## 2023-11-01 DIAGNOSIS — R39.12 BENIGN PROSTATIC HYPERPLASIA WITH WEAK URINARY STREAM: Primary | ICD-10-CM

## 2023-11-01 DIAGNOSIS — R35.0 FREQUENCY OF MICTURITION: ICD-10-CM

## 2023-11-01 DIAGNOSIS — N52.9 ED (ERECTILE DYSFUNCTION) OF ORGANIC ORIGIN: ICD-10-CM

## 2023-11-01 DIAGNOSIS — N40.1 BENIGN PROSTATIC HYPERPLASIA WITH WEAK URINARY STREAM: Primary | ICD-10-CM

## 2023-11-01 DIAGNOSIS — R33.9 INCOMPLETE EMPTYING OF BLADDER: ICD-10-CM

## 2023-11-01 LAB
BILIRUB BLD-MCNC: ABNORMAL MG/DL
CLARITY, POC: CLEAR
COLOR UR: YELLOW
EXPIRATION DATE: ABNORMAL
GLUCOSE UR STRIP-MCNC: NEGATIVE MG/DL
KETONES UR QL: NEGATIVE
LEUKOCYTE EST, POC: NEGATIVE
Lab: ABNORMAL
NITRITE UR-MCNC: NEGATIVE MG/ML
PH UR: 5.5 [PH] (ref 5–8)
PROT UR STRIP-MCNC: ABNORMAL MG/DL
RBC # UR STRIP: ABNORMAL /UL
SP GR UR: 1.03 (ref 1–1.03)
UROBILINOGEN UR QL: NORMAL

## 2023-11-01 PROCEDURE — 36415 COLL VENOUS BLD VENIPUNCTURE: CPT | Performed by: NURSE PRACTITIONER

## 2023-11-01 PROCEDURE — 99214 OFFICE O/P EST MOD 30 MIN: CPT | Performed by: NURSE PRACTITIONER

## 2023-11-01 PROCEDURE — 81003 URINALYSIS AUTO W/O SCOPE: CPT | Performed by: NURSE PRACTITIONER

## 2023-11-01 PROCEDURE — 84154 ASSAY OF PSA FREE: CPT | Performed by: NURSE PRACTITIONER

## 2023-11-01 PROCEDURE — 84153 ASSAY OF PSA TOTAL: CPT | Performed by: NURSE PRACTITIONER

## 2023-11-01 RX ORDER — TADALAFIL 5 MG/1
5 TABLET ORAL DAILY
Qty: 30 TABLET | Refills: 12 | Status: SHIPPED | OUTPATIENT
Start: 2023-11-01

## 2023-11-01 NOTE — PROGRESS NOTES
Chief Complaint  BPH WITH LUTS/PSA/ED/CONDYLOMA/MED REFILLS/VU. (1 year follow up)    Sage Box presents to [facility] for BPH WITH LUTS/ED  History of Present Illness    Mr. Adam Box is a pleasant 53-year-old male who returns to clinic today for evaluation. This is his yearly follow-up for BPH with lower urinary tract symptoms, most bothersome of which was frequency and nocturia. He is also follow-up for Erectile dysfunction, and for HPV WITH CONDYLOMA-RESOLVED.     When last evaluated in clinic, patient was in no apparent discomfort. He had reported lower urinary tract symptoms initially that were bothersome to him such as a slow stream and urinary hesitancy but had denied any symptoms of frequency, dysuria, or burning with urination. He was started on Cialis 5 mg daily to help with his BPH and ED for which he had reported remarkable improvement. He had also deferred a prostate exam because his last VU done by Dr. Sharma was negative for any nodularity. He did not have any suspicious rectal abnormalities.     His most recent PSA is 0.6 and that was completed on 11/01/2022. His PSA prior to that was 0.7, completed on 02/09/2020. . On initial evaluation in clinic today, he is in no apparent discomfort and denies any issues whatsoever. Urinalysis is completely negative for leukocyte esterase. It is negative for nitrite. It is negative for gross but show 1+ microscopic hematuria. His PVR is 0 mL with an IPSS score of 1.    Overall, the patient reports that he is doing well. He denies any prostate issues this year. He denies any urinary frequency, urgency, or burning. He is emptying his bladder well. He denies any new issues or condyloma. He is still taking Cialis like refills today.    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 Negative for appetite change, chills, diaphoresis, fatigue, fever, unexpected weight gain and unexpected weight loss.   HENT:  Negative for congestion, ear discharge, ear pain, nosebleeds, rhinorrhea, sinus pressure and sore throat.    Eyes:  Negative for blurred vision, double vision, photophobia, pain, redness and visual disturbance.   Respiratory:  Negative for apnea, cough, chest tightness, shortness of breath, wheezing and stridor.    Cardiovascular:  Negative for chest pain and palpitations.   Gastrointestinal:  Negative for abdominal distention, abdominal pain, constipation, diarrhea, nausea and vomiting.   Endocrine: Negative for polydipsia, polyphagia and polyuria.   Genitourinary:  Positive for frequency. Negative for decreased urine volume, difficulty urinating, discharge, dysuria, flank pain, genital sores, hematuria, penile pain, penile swelling, scrotal swelling, testicular pain, urgency and urinary incontinence.   Musculoskeletal:  Positive for back pain. Negative for arthralgias and joint swelling.   Skin:  Positive for dry skin. Negative for pallor, rash and wound.   Neurological:  Negative for dizziness, tremors, syncope, weakness, light-headedness, memory problem and confusion.   Psychiatric/Behavioral:  Positive for stress. Negative for agitation, behavioral problems and decreased concentration.         Physical Exam  Constitutional:       General: He is in acute " distress.      Appearance: He is well-developed.   HENT:      Head: Normocephalic and atraumatic.      Right Ear: External ear normal.      Left Ear: External ear normal.   Eyes:      General:         Right eye: No discharge.         Left eye: No discharge.      Conjunctiva/sclera: Conjunctivae normal.      Pupils: Pupils are equal, round, and reactive to light.   Neck:      Thyroid: No thyromegaly.      Trachea: No tracheal deviation.   Cardiovascular:      Rate and Rhythm: Normal rate and regular rhythm.      Heart sounds: No murmur heard.     No friction rub.   Pulmonary:      Effort: Pulmonary effort is normal. No respiratory distress.      Breath sounds: Normal breath sounds. No stridor.   Abdominal:      General: Bowel sounds are normal. There is distension.      Palpations: Abdomen is soft.      Tenderness: There is abdominal tenderness. There is no guarding.   Genitourinary:     Penis: Normal and uncircumcised. No tenderness or discharge.       Testes: Normal.      Rectum: Normal. Guaiac result negative.      Comments: URINE FREQUENCY/NOCTURIA  Musculoskeletal:         General: Tenderness present. No deformity. Normal range of motion.      Cervical back: Normal range of motion and neck supple.   Skin:     General: Skin is warm and dry.      Capillary Refill: Capillary refill takes less than 2 seconds.      Coloration: Skin is not pale.   Neurological:      Mental Status: He is alert and oriented to person, place, and time.      Cranial Nerves: No cranial nerve deficit.      Coordination: Coordination normal.   Psychiatric:         Behavior: Behavior normal.         Thought Content: Thought content normal.         Judgment: Judgment normal.        Result Review :     UA          11/1/2023    08:19   Urinalysis   Ketones, UA Negative    Leukocytes, UA Negative                 Assessment and Plan    Problem List Items Addressed This Visit          Genitourinary and Reproductive     ED (erectile dysfunction) of  organic origin    Relevant Medications    tadalafil (CIALIS) 5 MG tablet    Benign prostatic hyperplasia with weak urinary stream - Primary    Relevant Medications    tadalafil (CIALIS) 5 MG tablet    Other Relevant Orders    POC Urinalysis Dipstick, Automated (Completed)    PSA Total+% Free (Serial)    Frequency of micturition    Relevant Medications    tadalafil (CIALIS) 5 MG tablet    Incomplete emptying of bladder    Relevant Medications    tadalafil (CIALIS) 5 MG tablet                                    ASSESSMENT  BPH Without Urinary Obstruction/ED/CONDYLOMA-RESOLVED  Mr. Adam Box is a pleasant 53-year-old male who returns to clinic today for evaluation. Patient presents for his 1 year follow-up on BPH, and refill of his medications.  His most recent PSA is 0.6 and that was completed on 11/2022, his last PSA was 0.59, IT WAS 0.7 PRIOR. THE  Patient has BEEN on Cialis 5 mg for BPH/ED,  he is in no apparent distress and has minor urinary problems.  He denies any side effects from his medications.  His urine dipstick is completely negative for any infection, it is negative for gross/microscopic hematuria. His IPSS score is 1, with a PVR of 0 mL.        AGAIN, WE Discussed the pathophysiology of BPH and obstruction. We discussed the static and dynamic effects of BPH as well as using 5 alpha reductase inhibitors versus alpha blockade.  We discussed the indications for transurethral surgery as well.  And/ or other therapeutic options available including all of the newer techniques.  He has no real symptomatology referable to his prostate other than moderate enlargement.  Rather than proceed with an expensive workup he doesn't need, at this time I am recommending an alpha blocker with daily Cialis 5 mg.     PSA testing: We discussed rechecking a PSA blood test that stands for prostate specific antigen.  I discussed the pathophysiology of PSA testing indicating its use in the diagnosis and management of  prostate cancer.  I discussed the normal range being 0-4 but more appropriately being much closer to 0-2 in a normal male.  I discussed the fact that after certain age we don't recommend PSA testing especially in view of numerous comorbidities.  That this will not be a useful test.  I discussed many of the things that can artificially raised PSA including a recent infection, urinary tract infection, recent sexual intercourse.       Finally, WE discussed Erectile Dysfunction: We discussed the anatomy and physiology of the penis and the endothelium.  We discussed the various forms of erectile dysfunction including peripheral vascular occlusive disease, Postoperative, secondary to radiation treatments of the prostate, and arterial inflow. We discussed the various treatment options available including oral medication and its various forms.  We discussed the use of both generic and non-generic Viagra.  We discussed Cialis and a longer half-life of 17 hours as well as the other 2 medications.  We discussed cost involved with this including the fact that the generic is much cheaper but is taken has multiple pills because they are 20 mg dosages.  We did discuss the other alternatives including Penile injections, vacuum erection devices and surgical intervention reserved for only the most severe cases.  We discussed the need for testosterone in about 20% of cases of erectile dysfunction.      The patient desires Cialis 5 mg daily to treat both his Erectile dysfunction/BPH.  He is informed that CIALIS/Viagra is sometimes not covered by insurance. It is available on a fee-for-service cost basis, and is relatively expensive. With higher doses greater than 10 mg, the method of use 1 hour prior to anticipated intercourse is explained. He should not use any more than one tablet in a 24 hour period. The side effects of possible headache, flushing, dyspepsia and transient changes in vision have been explained. The patient is not  taking nitrates, and denies he has access to nitrates in any form at any time. I have counseled him that taking Viagra with nitrates of any form can cause death.     Condyloma/Genital Warts: RESOLVED he reports.                                        PLAN  WE CHECKED HIS PSA TODAY, I WILL CALL HIM WITH RESULTS     CONTINUE CIALIS 5 MG DAILY FOR BPH WITH  ED.(PT Educated to NEVER use alongside with Viagra)     Continue all other medications as prescribed, plan of care for Condyloma.     Follow up in ONE YEAR, SOONER IF NEED BE     Patient is agreeable with plan of care.    Patient reports that he is not currently experiencing any symptoms of urinary incontinence.    BMI is within normal parameters. No other follow-up for BMI required.      RADIOLOGY (CT AND/OR KUB):    CT Abdomen and Pelvis: No results found for this or any previous visit.     CT Stone Protocol: No results found for this or any previous visit.     KUB: No results found for this or any previous visit.       LABS (3 MONTHS):    Office Visit on 11/01/2023   Component Date Value Ref Range Status    Color 11/01/2023 Yellow  Yellow, Straw, Dark Yellow, Minal Final    Clarity, UA 11/01/2023 Clear  Clear Final    Specific Gravity  11/01/2023 1.030  1.005 - 1.030 Final    pH, Urine 11/01/2023 5.5  5.0 - 8.0 Final    Leukocytes 11/01/2023 Negative  Negative Final    Nitrite, UA 11/01/2023 Negative  Negative Final    Protein, POC 11/01/2023 Trace (A)  Negative mg/dL Final    Glucose, UA 11/01/2023 Negative  Negative mg/dL Final    Ketones, UA 11/01/2023 Negative  Negative Final    Urobilinogen, UA 11/01/2023 Normal  Normal, 0.2 E.U./dL Final    Bilirubin 11/01/2023 Small (1+) (A)  Negative Final    Blood, UA 11/01/2023 1+ (A)  Negative Final    Lot Number 11/01/2023 [id number]   Final    Expiration Date 11/01/2023 10/25/2024   Final        IPSS Questionnaire (AUA-7):  Over the past month…    1)  How often have you had a sensation of not emptying your  bladder completely after you finish urinating?  0 - Not at all   2)  How often have you had to urinate again less than two hours after you finished urinating? 0 - Not at all   3)  How often have you found you stopped and started again several times when you urinated?  0 - Not at all   4) How difficult have you found it to postpone urination?  0 - Not at all   5) How often have you had a weak urinary stream?  0 - Not at all   6) How often have you had to push or strain to begin urination?  0 - Not at all   7) How many times did you most typically get up to urinate from the time you went to bed until the time you got up in the morning?  1 - 1 time   Total Score:  1     Assessment:  1. BPH with lower urinary tract symptoms.  2. Erectile dysfunction.  3. Condyloma.    Plan:  - Urine culture will be obtained.  - Continue Cialis.  - Follow up in 1 year.      Smoking Cessation Counseling:  Former smoker.  Patient does not currently use any tobacco products.     Follow Up   Return in about 1 year (around 11/1/2024) for Next scheduled follow up, FOR ED/ BPH W LUTS/PSA/-PROSTATE VU/MED REFILLS/LABS.    Patient was given instructions and counseling regarding his condition or for health maintenance advice. Please see specific information pulled into the AVS if appropriate.          This document has been electronically signed by Griselda Cheng-Akwa, APRN   November 1, 2023 11:30 EDT      Dictated Utilizing Dragon Dictation: Part of this note may be an electronic transcription/translation of spoken language to printed text using the Dragon Dictation System.     Transcribed from ambient dictation for Griselda Cheng-Akwa, APRN by Pearl Almonte.  11/01/23   09:35 EDT    Patient or patient representative verbalized consent to the visit recording.  I have personally performed the services described in this document as transcribed by the above individual, and it is both accurate and complete.

## 2023-11-03 ENCOUNTER — TELEPHONE (OUTPATIENT)
Dept: UROLOGY | Facility: CLINIC | Age: 53
End: 2023-11-03
Payer: COMMERCIAL

## 2023-11-03 LAB
PSA FREE MFR SERPL: 38.6 %
PSA FREE SERPL-MCNC: 0.27 NG/ML
PSA SERPL-MCNC: 0.7 NG/ML (ref 0–4)

## 2023-11-03 NOTE — TELEPHONE ENCOUNTER
I tried to call the pt and the phone said the caller was unavailable.      ----- Message from Griselda Cheng-Akwa, APRN sent at 11/3/2023  1:02 PM EDT -----  Please let patient know his PSA results of 0.7 with a free PSA of 38.6% which is unremarkable.  Gives patient a less than 5% chance of any prostate cancer.  He should follow-up in clinic in 1 year, he may return sooner if any concerns.    Thank you

## 2024-11-19 ENCOUNTER — OFFICE VISIT (OUTPATIENT)
Dept: UROLOGY | Facility: CLINIC | Age: 54
End: 2024-11-19
Payer: COMMERCIAL

## 2024-11-19 VITALS
BODY MASS INDEX: 23.23 KG/M2 | SYSTOLIC BLOOD PRESSURE: 102 MMHG | HEART RATE: 87 BPM | DIASTOLIC BLOOD PRESSURE: 66 MMHG | WEIGHT: 157.4 LBS

## 2024-11-19 DIAGNOSIS — R35.0 FREQUENCY OF MICTURITION: Primary | ICD-10-CM

## 2024-11-19 DIAGNOSIS — R35.0 FREQUENCY OF URINATION: ICD-10-CM

## 2024-11-19 DIAGNOSIS — N40.1 BENIGN PROSTATIC HYPERPLASIA WITH WEAK URINARY STREAM: ICD-10-CM

## 2024-11-19 DIAGNOSIS — R39.12 BENIGN PROSTATIC HYPERPLASIA WITH WEAK URINARY STREAM: ICD-10-CM

## 2024-11-19 DIAGNOSIS — N52.9 ED (ERECTILE DYSFUNCTION) OF ORGANIC ORIGIN: ICD-10-CM

## 2024-11-19 DIAGNOSIS — R33.9 INCOMPLETE EMPTYING OF BLADDER: ICD-10-CM

## 2024-11-19 LAB
BILIRUB BLD-MCNC: NEGATIVE MG/DL
CLARITY, POC: ABNORMAL
COLOR UR: YELLOW
EXPIRATION DATE: ABNORMAL
GLUCOSE UR STRIP-MCNC: NEGATIVE MG/DL
KETONES UR QL: NEGATIVE
LEUKOCYTE EST, POC: NEGATIVE
Lab: ABNORMAL
NITRITE UR-MCNC: NEGATIVE MG/ML
PH UR: 7 [PH] (ref 5–8)
PROT UR STRIP-MCNC: NEGATIVE MG/DL
RBC # UR STRIP: NEGATIVE /UL
SP GR UR: 1.01 (ref 1–1.03)
UROBILINOGEN UR QL: NORMAL

## 2024-11-19 PROCEDURE — 99214 OFFICE O/P EST MOD 30 MIN: CPT | Performed by: NURSE PRACTITIONER

## 2024-11-19 PROCEDURE — 84154 ASSAY OF PSA FREE: CPT | Performed by: NURSE PRACTITIONER

## 2024-11-19 PROCEDURE — 81003 URINALYSIS AUTO W/O SCOPE: CPT | Performed by: NURSE PRACTITIONER

## 2024-11-19 PROCEDURE — 84153 ASSAY OF PSA TOTAL: CPT | Performed by: NURSE PRACTITIONER

## 2024-11-19 RX ORDER — TADALAFIL 5 MG/1
5 TABLET ORAL DAILY
Qty: 30 TABLET | Refills: 12 | Status: SHIPPED | OUTPATIENT
Start: 2024-11-19 | End: 2024-12-19

## 2024-11-19 NOTE — PROGRESS NOTES
Chief Complaint  Benign Prostatic Hypertrophy (YEARLY FOLLOW UP BPH W. LUTS )    Subjective        Adam Box presents to [facility] for BPH WITH LUTS/ED  History of Present Illness    Mr. Adam Box is a pleasant 54-year-old male who returns to clinic today for evaluation. This is his yearly follow-up for BPH with lower urinary tract symptoms, most bothersome of which was frequency and nocturia. He is also follow-up for Erectile dysfunction, and for HPV WITH CONDYLOMA-RESOLVED.     When last evaluated in clinic, patient was in no apparent discomfort. He had reported lower urinary tract symptoms initially that were bothersome to him such as a slow stream and urinary hesitancy but had denied any symptoms of frequency, dysuria, or burning with urination. He was started on Cialis 5 mg daily to help with his BPH and ED for which he had reported remarkable improvement. He had also deferred a prostate exam because his last VU done by Dr. Sharma was negative for any nodularity. He did not have any suspicious rectal abnormalities.     His most recent PSA is 0.7, ON 11/01/2023. HIS PSA PRIOR WAS 0.6 and that was completed on 11/01/2022. His PSA prior to that was 0.7, completed on 02/09/2020.  On initial evaluation in clinic today, 11/19/2024, he is in no apparent discomfort and denies any issues WITH BPH OR ED whatsoever. Urinalysis is completely negative for leukocyte esterase. It is negative for nitrite. It is negative for gross AND microscopic hematuria. His PVR is 0 mL with an IPSS score of 1.    Overall, the patient reports that he is doing well. He denies any prostate issues this year. He denies any urinary frequency, urgency, or burning. He is emptying his bladder well. He denies any new issues or condyloma. He is still taking Cialis AND  like refills today.     HE  DENIES ANY SIDE EFFECTS FROM HIS MEDICATIONS.    Active Ambulatory Problems     Diagnosis Date  Noted    ED (erectile dysfunction) of organic origin 09/28/2016    Benign prostatic hyperplasia with weak urinary stream 10/21/2021    Condyloma acuminatum due to human papillomavirus 10/21/2021    Frequency of urination 11/01/2023    Incomplete emptying of bladder 11/01/2023     Resolved Ambulatory Problems     Diagnosis Date Noted    No Resolved Ambulatory Problems     Past Medical History:   Diagnosis Date    Erectile dysfunction       Objective   Vital Signs:   /66   Pulse 87   Wt 71.4 kg (157 lb 6.4 oz)   BMI 23.23 kg/m²       ROS:   Review of Systems   Constitutional:  Positive for activity change. Negative for appetite change, chills, diaphoresis, fatigue, fever, unexpected weight gain and unexpected weight loss.   HENT:  Negative for congestion, ear discharge, ear pain, nosebleeds, rhinorrhea, sinus pressure and sore throat.    Eyes:  Negative for blurred vision, double vision, photophobia, pain, redness and visual disturbance.   Respiratory:  Negative for apnea, cough, chest tightness, shortness of breath, wheezing and stridor.    Cardiovascular:  Negative for chest pain and palpitations.   Gastrointestinal:  Negative for abdominal distention, abdominal pain, constipation, diarrhea, nausea and vomiting.   Endocrine: Negative for polydipsia, polyphagia and polyuria.   Genitourinary:  Positive for frequency. Negative for decreased urine volume, difficulty urinating, discharge, dysuria, flank pain, genital sores, hematuria, penile pain, penile swelling, scrotal swelling, testicular pain, urgency and urinary incontinence.   Musculoskeletal:  Positive for back pain. Negative for arthralgias and joint swelling.   Skin:  Positive for dry skin. Negative for pallor, rash and wound.   Neurological:  Negative for dizziness, tremors, syncope, weakness, light-headedness, memory problem and confusion.   Psychiatric/Behavioral:  Positive for stress. Negative for agitation, behavioral problems and decreased  concentration.         Physical Exam  Constitutional:       General: He is in acute distress.      Appearance: He is well-developed.   HENT:      Head: Normocephalic and atraumatic.      Right Ear: External ear normal.      Left Ear: External ear normal.   Eyes:      General:         Right eye: No discharge.         Left eye: No discharge.      Conjunctiva/sclera: Conjunctivae normal.      Pupils: Pupils are equal, round, and reactive to light.   Neck:      Thyroid: No thyromegaly.      Trachea: No tracheal deviation.   Cardiovascular:      Rate and Rhythm: Normal rate and regular rhythm.      Heart sounds: No murmur heard.     No friction rub.   Pulmonary:      Effort: Pulmonary effort is normal. No respiratory distress.      Breath sounds: Normal breath sounds. No stridor.   Abdominal:      General: Bowel sounds are normal. There is distension.      Palpations: Abdomen is soft.      Tenderness: There is abdominal tenderness. There is no guarding.   Genitourinary:     Penis: Normal and uncircumcised. No tenderness or discharge.       Testes: Normal.      Rectum: Normal. Guaiac result negative.      Comments: URINE FREQUENCY/NOCTURIA  Musculoskeletal:         General: Tenderness present. No deformity. Normal range of motion.      Cervical back: Normal range of motion and neck supple.   Skin:     General: Skin is warm and dry.      Capillary Refill: Capillary refill takes less than 2 seconds.      Coloration: Skin is not pale.   Neurological:      Mental Status: He is alert and oriented to person, place, and time.      Cranial Nerves: No cranial nerve deficit.      Coordination: Coordination normal.   Psychiatric:         Behavior: Behavior normal.         Thought Content: Thought content normal.         Judgment: Judgment normal.        Result Review :     UA          11/19/2024    14:34   Urinalysis   Ketones, UA Negative    Leukocytes, UA Negative        PSA          11/19/2024    14:48   PSA   PSA 0.8          Assessment and Plan    Problem List Items Addressed This Visit          Genitourinary and Reproductive     ED (erectile dysfunction) of organic origin    Relevant Medications    tadalafil (CIALIS) 5 MG tablet    Benign prostatic hyperplasia with weak urinary stream    Relevant Medications    tadalafil (CIALIS) 5 MG tablet    Frequency of urination - Primary    Relevant Medications    tadalafil (CIALIS) 5 MG tablet    Other Relevant Orders    POC Urinalysis Dipstick, Automated (Completed)    PSA Total+% Free (Serial) (Completed)    Incomplete emptying of bladder    Relevant Medications    tadalafil (CIALIS) 5 MG tablet                                    ASSESSMENT  BPH Without Urinary Obstruction/ED/CONDYLOMA-RESOLVED  Mr. Adam Box is a pleasant 54-year-old male who returns to clinic today for evaluation. Patient presents for his 1 year follow-up on BPH,ED and refill of his medications. HE DENIES ANY URINARY symptoms of frequency, urgency or dysuria.     His most recent PSA is 0.7 on 11/1/2024, His PSA prior is 0.6 and that was completed on 11/2022, his last PSA was 0.59, IT WAS 0.7 PRIOR. THE  Patient has BEEN on Cialis 5 mg for BPH/ED,  he is in no apparent distress and denies any urinary problems.  He denies any side effects from his medications.  His urine dipstick today is completely negative for any infection, it is negative for gross/microscopic hematuria. His IPSS score is 1, with a PVR of 0 mL.      AGAIN, WE Discussed the pathophysiology of BPH and obstruction. We discussed the static and dynamic effects of BPH as well as using 5 alpha reductase inhibitors versus alpha blockade.  We discussed the indications for transurethral surgery as well.  And/ or other therapeutic options available including all of the newer techniques.  He has no real symptomatology referable to his prostate other than moderate enlargement.  Rather than proceed with an expensive workup he doesn't need, at this time I am  recommending an alpha blocker with daily Cialis 5 mg.     PSA testing: We discussed rechecking a PSA blood test that stands for prostate specific antigen.  I discussed the pathophysiology of PSA testing indicating its use in the diagnosis and management of prostate cancer.  I discussed the normal range being 0-4 but more appropriately being much closer to 0-2 in a normal male.  I discussed the fact that after certain age we don't recommend PSA testing especially in view of numerous comorbidities.  That this will not be a useful test.  I discussed many of the things that can artificially raised PSA including a recent infection, urinary tract infection, recent sexual intercourse.       Finally, WE discussed Erectile Dysfunction: We discussed the anatomy and physiology of the penis and the endothelium.  We discussed the various forms of erectile dysfunction including peripheral vascular occlusive disease, Postoperative, secondary to radiation treatments of the prostate, and arterial inflow. We discussed the various treatment options available including oral medication and its various forms.  We discussed the use of both generic and non-generic Viagra.  We discussed Cialis and a longer half-life of 17 hours as well as the other 2 medications.  We discussed cost involved with this including the fact that the generic is much cheaper but is taken has multiple pills because they are 20 mg dosages.  We did discuss the other alternatives including Penile injections, vacuum erection devices and surgical intervention reserved for only the most severe cases.  We discussed the need for testosterone in about 20% of cases of erectile dysfunction.      The patient desires Cialis 5 mg daily to treat both his Erectile dysfunction, BPH with LUTS.  He is informed that CIALIS/Viagra is sometimes not covered by insurance. It is available on a fee-for-service cost basis, and is relatively expensive. With higher doses greater than 10 mg,  the method of use 1 hour prior to anticipated intercourse is explained. He should not use any more than one tablet in a 24 hour period. The side effects of possible headache, flushing, dyspepsia and transient changes in vision have been explained. The patient is not taking nitrates, and denies he has access to nitrates in any form at any time. I have counseled him that taking Viagra with nitrates of any form can cause death.     Condyloma/Genital Warts: RESOLVED he reports.                                        PLAN  WE CHECKED HIS PSA TODAY, I WILL CALL HIM WITH RESULTS     CONTINUE CIALIS 5 MG DAILY FOR BPH WITH  ED.(PT Educated to NEVER use alongside with Viagra)     Continue all other medications as prescribed, plan of care for Condyloma.     Follow up in ONE YEAR, SOONER IF NEED BE     Patient is Agreeable with Plan of Care.    Patient reports that he is not currently experiencing any symptoms of urinary incontinence.    BMI is within normal parameters. No other follow-up for BMI required.    RADIOLOGY (CT AND/OR KUB):    CT Abdomen and Pelvis: No results found for this or any previous visit.     CT Stone Protocol: No results found for this or any previous visit.     KUB: No results found for this or any previous visit.       LABS (3 MONTHS):    Office Visit on 11/19/2024   Component Date Value Ref Range Status    Color 11/19/2024 Yellow  Yellow, Straw, Dark Yellow, Minal Final    Clarity, UA 11/19/2024 Slightly Cloudy (A)  Clear Final    Specific Gravity  11/19/2024 1.015  1.005 - 1.030 Final    pH, Urine 11/19/2024 7.0  5.0 - 8.0 Final    Leukocytes 11/19/2024 Negative  Negative Final    Nitrite, UA 11/19/2024 Negative  Negative Final    Protein, POC 11/19/2024 Negative  Negative mg/dL Final    Glucose, UA 11/19/2024 Negative  Negative mg/dL Final    Ketones, UA 11/19/2024 Negative  Negative Final    Urobilinogen, UA 11/19/2024 Normal  Normal, 0.2 E.U./dL Final    Bilirubin 11/19/2024 Negative  Negative  Final    Blood, UA 11/19/2024 Negative  Negative Final    Lot Number 11/19/2024 [id number]   Final    Expiration Date 11/19/2024 05/01/2026   Final    PSA 11/19/2024 0.8  0.0 - 4.0 ng/mL Final    Roche ECLIA methodology.  According to the American Urological Association, Serum PSA should  decrease and remain at undetectable levels after radical  prostatectomy. The AUA defines biochemical recurrence as an initial  PSA value 0.2 ng/mL or greater followed by a subsequent confirmatory  PSA value 0.2 ng/mL or greater.  Values obtained with different assay methods or kits cannot be used  interchangeably. Results cannot be interpreted as absolute evidence  of the presence or absence of malignant disease.    PSA, Free 11/19/2024 0.25  N/A ng/mL Final    Roche ECLIA methodology.    PSA, Free % 11/19/2024 31.3  % Final    The table below lists the probability of prostate cancer for  men with non-suspicious VU results and total PSA between  4 and 10 ng/mL, by patient age (Robin et al, COLLETTE 1998,  279:1542).                    % Free PSA       50-64 yr        65-75 yr                    0.00-10.00%        56%             55%                   10.01-15.00%        24%             35%                   15.01-20.00%        17%             23%                   20.01-25.00%        10%             20%                        >25.00%         5%              9%  Please note:  Robin et al did not make specific                recommendations regarding the use of                percent free PSA for any other population                of men.        IPSS Questionnaire (AUA-7):  Over the past month…    1)  How often have you had a sensation of not emptying your bladder completely after you finish urinating?  0 - Not at all   2)  How often have you had to urinate again less than two hours after you finished urinating? 0 - Not at all   3)  How often have you found you stopped and started again several times when you urinated?  0 -  Not at all   4) How difficult have you found it to postpone urination?  0 - Not at all   5) How often have you had a weak urinary stream?  0 - Not at all   6) How often have you had to push or strain to begin urination?  0 - Not at all   7) How many times did you most typically get up to urinate from the time you went to bed until the time you got up in the morning?  1 - 1 time   Total Score:  1       Smoking Cessation Counseling:  Former smoker.  Patient does not currently use any tobacco products.     Follow Up   Return in about 1 year (around 11/19/2025) for Next scheduled follow up, FOR  BPH W LUTS/ED/PSA/-PROSTATE VU/MED REFILLS/LABS.    Patient was given instructions and counseling regarding his condition or for health maintenance advice. Please see specific information pulled into the AVS if appropriate.          This document has been electronically signed by Griselda Cheng-Akwa, APRN   November 21, 2024 21:14 EST      Dictated Utilizing Dragon Dictation: Part of this note may be an electronic transcription/translation of spoken language to printed text using the Dragon Dictation System.      Patient or patient representative verbalized consent to the visit recording.  I have personally performed the services described in this document as transcribed by the above individual, and it is both accurate and complete.

## 2024-11-21 LAB
PSA FREE MFR SERPL: 31.3 %
PSA FREE SERPL-MCNC: 0.25 NG/ML
PSA SERPL-MCNC: 0.8 NG/ML (ref 0–4)

## 2024-11-22 ENCOUNTER — TELEPHONE (OUTPATIENT)
Dept: UROLOGY | Facility: CLINIC | Age: 54
End: 2024-11-22
Payer: COMMERCIAL

## 2024-11-22 NOTE — TELEPHONE ENCOUNTER
I called the pt with the results and left him a vm    ----- Message from Griselda Cheng-Akwa sent at 11/21/2024  3:40 PM EST -----  Please let patient know his PSA results are 0.8 with a free PSA of 31.3% which is unremarkable at this time.  Follow-up in clinic as discussed in 1 year.  May return sooner if need be.    Thank you